# Patient Record
Sex: MALE | Race: WHITE | Employment: UNEMPLOYED | URBAN - METROPOLITAN AREA
[De-identification: names, ages, dates, MRNs, and addresses within clinical notes are randomized per-mention and may not be internally consistent; named-entity substitution may affect disease eponyms.]

---

## 2018-10-08 ENCOUNTER — APPOINTMENT (OUTPATIENT)
Dept: CT IMAGING | Age: 63
End: 2018-10-08
Attending: EMERGENCY MEDICINE
Payer: MEDICARE

## 2018-10-08 ENCOUNTER — APPOINTMENT (OUTPATIENT)
Dept: GENERAL RADIOLOGY | Age: 63
End: 2018-10-08
Attending: PHYSICIAN ASSISTANT
Payer: MEDICARE

## 2018-10-08 ENCOUNTER — APPOINTMENT (OUTPATIENT)
Dept: GENERAL RADIOLOGY | Age: 63
End: 2018-10-08
Attending: EMERGENCY MEDICINE
Payer: MEDICARE

## 2018-10-08 ENCOUNTER — HOSPITAL ENCOUNTER (EMERGENCY)
Age: 63
Discharge: HOME | End: 2018-10-08
Attending: EMERGENCY MEDICINE | Admitting: EMERGENCY MEDICINE
Payer: MEDICARE

## 2018-10-08 ENCOUNTER — HOSPITAL ENCOUNTER (EMERGENCY)
Age: 63
Discharge: HOME | End: 2018-10-09
Attending: EMERGENCY MEDICINE | Admitting: EMERGENCY MEDICINE
Payer: MEDICARE

## 2018-10-08 VITALS
WEIGHT: 120 LBS | HEIGHT: 68 IN | BODY MASS INDEX: 18.19 KG/M2 | OXYGEN SATURATION: 99 % | HEART RATE: 97 BPM | SYSTOLIC BLOOD PRESSURE: 117 MMHG | RESPIRATION RATE: 17 BRPM | TEMPERATURE: 98 F | DIASTOLIC BLOOD PRESSURE: 78 MMHG

## 2018-10-08 DIAGNOSIS — F10.920 ACUTE ALCOHOLIC INTOXICATION WITHOUT COMPLICATION (HCC): ICD-10-CM

## 2018-10-08 DIAGNOSIS — R06.02 SOB (SHORTNESS OF BREATH): ICD-10-CM

## 2018-10-08 DIAGNOSIS — S42.292D OTHER CLOSED DISPLACED FRACTURE OF PROXIMAL END OF LEFT HUMERUS WITH ROUTINE HEALING, SUBSEQUENT ENCOUNTER: Primary | ICD-10-CM

## 2018-10-08 DIAGNOSIS — S42.215A CLOSED NONDISPLACED FRACTURE OF SURGICAL NECK OF LEFT HUMERUS, UNSPECIFIED FRACTURE MORPHOLOGY, INITIAL ENCOUNTER: Primary | ICD-10-CM
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
## 2018-10-08 LAB
ALBUMIN SERPL-MCNC: 3.3 G/DL (ref 3.4–5)
ALBUMIN/GLOB SERPL: 0.9 {RATIO} (ref 0.8–1.7)
ALP SERPL-CCNC: 164 U/L (ref 45–117)
ALT SERPL-CCNC: 30 U/L (ref 16–61)
AMPHET UR QL SCN: NEGATIVE
ANION GAP SERPL CALC-SCNC: 14 MMOL/L (ref 3–18)
AST SERPL-CCNC: 69 U/L (ref 15–37)
BARBITURATES UR QL SCN: NEGATIVE
BASOPHILS # BLD: 0 K/UL (ref 0–0.1)
BASOPHILS NFR BLD: 0 % (ref 0–2)
BENZODIAZ UR QL: NEGATIVE
BILIRUB SERPL-MCNC: 0.7 MG/DL (ref 0.2–1)
BUN SERPL-MCNC: 3 MG/DL (ref 7–18)
BUN/CREAT SERPL: 6 (ref 12–20)
CALCIUM SERPL-MCNC: 8.1 MG/DL (ref 8.5–10.1)
CANNABINOIDS UR QL SCN: NEGATIVE
CHLORIDE SERPL-SCNC: 95 MMOL/L (ref 100–108)
CK MB CFR SERPL CALC: 4 % (ref 0–4)
CK MB SERPL-MCNC: 4.4 NG/ML (ref 5–25)
CK SERPL-CCNC: 109 U/L (ref 39–308)
CO2 SERPL-SCNC: 25 MMOL/L (ref 21–32)
COCAINE UR QL SCN: NEGATIVE
CREAT SERPL-MCNC: 0.53 MG/DL (ref 0.6–1.3)
DIFFERENTIAL METHOD BLD: ABNORMAL
EOSINOPHIL # BLD: 0 K/UL (ref 0–0.4)
EOSINOPHIL NFR BLD: 0 % (ref 0–5)
ERYTHROCYTE [DISTWIDTH] IN BLOOD BY AUTOMATED COUNT: 11.7 % (ref 11.6–14.5)
ETHANOL SERPL-MCNC: 253 MG/DL (ref 0–3)
GLOBULIN SER CALC-MCNC: 3.6 G/DL (ref 2–4)
GLUCOSE SERPL-MCNC: 96 MG/DL (ref 74–99)
HCT VFR BLD AUTO: 44.5 % (ref 36–48)
HDSCOM,HDSCOM: NORMAL
HGB BLD-MCNC: 16.1 G/DL (ref 13–16)
LYMPHOCYTES # BLD: 1 K/UL (ref 0.9–3.6)
LYMPHOCYTES NFR BLD: 15 % (ref 21–52)
MAGNESIUM SERPL-MCNC: 1.8 MG/DL (ref 1.6–2.6)
MCH RBC QN AUTO: 34 PG (ref 24–34)
MCHC RBC AUTO-ENTMCNC: 36.2 G/DL (ref 31–37)
MCV RBC AUTO: 94.1 FL (ref 74–97)
METHADONE UR QL: NEGATIVE
MONOCYTES # BLD: 0.3 K/UL (ref 0.05–1.2)
MONOCYTES NFR BLD: 5 % (ref 3–10)
NEUTS SEG # BLD: 5.1 K/UL (ref 1.8–8)
NEUTS SEG NFR BLD: 80 % (ref 40–73)
OPIATES UR QL: NEGATIVE
PCP UR QL: NEGATIVE
PLATELET # BLD AUTO: 180 K/UL (ref 135–420)
PMV BLD AUTO: 9.4 FL (ref 9.2–11.8)
POTASSIUM SERPL-SCNC: 3.6 MMOL/L (ref 3.5–5.5)
PROT SERPL-MCNC: 6.9 G/DL (ref 6.4–8.2)
RBC # BLD AUTO: 4.73 M/UL (ref 4.7–5.5)
SODIUM SERPL-SCNC: 134 MMOL/L (ref 136–145)
TROPONIN I SERPL-MCNC: 0.04 NG/ML (ref 0–0.04)
WBC # BLD AUTO: 6.4 K/UL (ref 4.6–13.2)

## 2018-10-08 PROCEDURE — 74150 CT ABDOMEN W/O CONTRAST: CPT

## 2018-10-08 PROCEDURE — 80307 DRUG TEST PRSMV CHEM ANLYZR: CPT | Performed by: EMERGENCY MEDICINE

## 2018-10-08 PROCEDURE — 94640 AIRWAY INHALATION TREATMENT: CPT

## 2018-10-08 PROCEDURE — 93005 ELECTROCARDIOGRAM TRACING: CPT

## 2018-10-08 PROCEDURE — 80053 COMPREHEN METABOLIC PANEL: CPT | Performed by: EMERGENCY MEDICINE

## 2018-10-08 PROCEDURE — 96374 THER/PROPH/DIAG INJ IV PUSH: CPT

## 2018-10-08 PROCEDURE — 99285 EMERGENCY DEPT VISIT HI MDM: CPT

## 2018-10-08 PROCEDURE — 77030018836 HC SOL IRR NACL ICUM -A

## 2018-10-08 PROCEDURE — 74011000250 HC RX REV CODE- 250: Performed by: EMERGENCY MEDICINE

## 2018-10-08 PROCEDURE — 85025 COMPLETE CBC W/AUTO DIFF WBC: CPT | Performed by: EMERGENCY MEDICINE

## 2018-10-08 PROCEDURE — 74011250636 HC RX REV CODE- 250/636: Performed by: EMERGENCY MEDICINE

## 2018-10-08 PROCEDURE — 77030029684 HC NEB SM VOL KT MONA -A

## 2018-10-08 PROCEDURE — 74011250637 HC RX REV CODE- 250/637: Performed by: EMERGENCY MEDICINE

## 2018-10-08 PROCEDURE — 82550 ASSAY OF CK (CPK): CPT | Performed by: EMERGENCY MEDICINE

## 2018-10-08 PROCEDURE — 73030 X-RAY EXAM OF SHOULDER: CPT

## 2018-10-08 PROCEDURE — 70450 CT HEAD/BRAIN W/O DYE: CPT

## 2018-10-08 PROCEDURE — 73020 X-RAY EXAM OF SHOULDER: CPT

## 2018-10-08 PROCEDURE — 83735 ASSAY OF MAGNESIUM: CPT | Performed by: EMERGENCY MEDICINE

## 2018-10-08 PROCEDURE — L3650 SO 8 ABD RESTRAINT PRE OTS: HCPCS

## 2018-10-08 RX ORDER — IPRATROPIUM BROMIDE AND ALBUTEROL SULFATE 2.5; .5 MG/3ML; MG/3ML
3 SOLUTION RESPIRATORY (INHALATION)
Status: COMPLETED | OUTPATIENT
Start: 2018-10-08 | End: 2018-10-08

## 2018-10-08 RX ORDER — MORPHINE SULFATE 4 MG/ML
4 INJECTION INTRAVENOUS
Status: COMPLETED | OUTPATIENT
Start: 2018-10-08 | End: 2018-10-08

## 2018-10-08 RX ORDER — MORPHINE SULFATE 4 MG/ML
4 INJECTION INTRAVENOUS
Status: DISCONTINUED | OUTPATIENT
Start: 2018-10-08 | End: 2018-10-08

## 2018-10-08 RX ORDER — CYCLOBENZAPRINE HCL 5 MG
10 TABLET ORAL 3 TIMES DAILY
Qty: 18 TAB | Refills: 0 | Status: SHIPPED | OUTPATIENT
Start: 2018-10-08 | End: 2018-10-08

## 2018-10-08 RX ORDER — IBUPROFEN 800 MG/1
800 TABLET ORAL EVERY 8 HOURS
Qty: 15 TAB | Refills: 0 | Status: SHIPPED | OUTPATIENT
Start: 2018-10-08 | End: 2018-10-13

## 2018-10-08 RX ORDER — HYDROCODONE BITARTRATE AND ACETAMINOPHEN 5; 325 MG/1; MG/1
1 TABLET ORAL
Status: COMPLETED | OUTPATIENT
Start: 2018-10-08 | End: 2018-10-08

## 2018-10-08 RX ORDER — MORPHINE SULFATE 10 MG/ML
4 INJECTION, SOLUTION INTRAMUSCULAR; INTRAVENOUS
Status: DISCONTINUED | OUTPATIENT
Start: 2018-10-08 | End: 2018-10-08

## 2018-10-08 RX ORDER — BACITRACIN 500 UNIT/G
5 PACKET (EA) TOPICAL
Status: COMPLETED | OUTPATIENT
Start: 2018-10-08 | End: 2018-10-08

## 2018-10-08 RX ORDER — IBUPROFEN 800 MG/1
800 TABLET ORAL EVERY 8 HOURS
Qty: 15 TAB | Refills: 0 | Status: SHIPPED | OUTPATIENT
Start: 2018-10-08 | End: 2018-10-08

## 2018-10-08 RX ORDER — HYDROCODONE BITARTRATE AND ACETAMINOPHEN 5; 325 MG/1; MG/1
TABLET ORAL
Qty: 6 TAB | Refills: 0 | Status: SHIPPED | OUTPATIENT
Start: 2018-10-08

## 2018-10-08 RX ORDER — ONDANSETRON 4 MG/1
4 TABLET, ORALLY DISINTEGRATING ORAL
Status: COMPLETED | OUTPATIENT
Start: 2018-10-08 | End: 2018-10-08

## 2018-10-08 RX ADMIN — IPRATROPIUM BROMIDE AND ALBUTEROL SULFATE 3 ML: .5; 3 SOLUTION RESPIRATORY (INHALATION) at 20:15

## 2018-10-08 RX ADMIN — MORPHINE SULFATE 4 MG: 4 INJECTION INTRAVENOUS at 13:58

## 2018-10-08 RX ADMIN — HYDROCODONE BITARTRATE AND ACETAMINOPHEN 1 TABLET: 5; 325 TABLET ORAL at 20:15

## 2018-10-08 RX ADMIN — ONDANSETRON 4 MG: 4 TABLET, ORALLY DISINTEGRATING ORAL at 20:28

## 2018-10-08 RX ADMIN — BACITRACIN ZINC 5 PACKET: 500 OINTMENT TOPICAL at 14:25

## 2018-10-08 RX ADMIN — HYDROCODONE BITARTRATE AND ACETAMINOPHEN 1 TABLET: 5; 325 TABLET ORAL at 22:50

## 2018-10-08 NOTE — DISCHARGE INSTRUCTIONS
SPECIFIC PATIENT INSTRUCTIONS FROM THE PHYSICIAN WHO TREATED YOU IN THE ER TODAY:  1. Ibuprofen as prescribed until finished. 2. Norco for pain not controlled with the ibuprofen. 3. Return if any concerns or worsening condition(s). 4. FOLLOW UP APPOINTMENT:  Your primary doctor in 1-2 days. Broken Arm: Care Instructions  Your Care Instructions  Fractures can range from a small, hairline crack, to a bone or bones broken into two or more pieces. Your treatment depends on how bad the break is. Your doctor may have put your arm in a splint or cast to allow it to heal or to keep it stable until you see another doctor. It may take weeks or months for your arm to heal. You can help your arm heal with some care at home. You heal best when you take good care of yourself. Eat a variety of healthy foods, and don't smoke. You may have had a sedative to help you relax. You may be unsteady after having sedation. It can take a few hours for the medicine's effects to wear off. Common side effects of sedation include nausea, vomiting, and feeling sleepy or tired. The doctor has checked you carefully, but problems can develop later. If you notice any problems or new symptoms, get medical treatment right away. Follow-up care is a key part of your treatment and safety. Be sure to make and go to all appointments, and call your doctor if you are having problems. It's also a good idea to know your test results and keep a list of the medicines you take. How can you care for yourself at home? · If the doctor gave you a sedative:  ¨ For 24 hours, don't do anything that requires attention to detail. It takes time for the medicine's effects to completely wear off. ¨ For your safety, do not drive or operate any machinery that could be dangerous. Wait until the medicine wears off and you can think clearly and react easily. · Put ice or a cold pack on your arm for 10 to 20 minutes at a time.  Try to do this every 1 to 2 hours for the next 3 days (when you are awake). Put a thin cloth between the ice and your cast or splint. Keep the cast or splint dry. · Follow the cast care instructions your doctor gives you. If you have a splint, do not take it off unless your doctor tells you to. · Be safe with medicines. Take pain medicines exactly as directed. ¨ If the doctor gave you a prescription medicine for pain, take it as prescribed. ¨ If you are not taking a prescription pain medicine, ask your doctor if you can take an over-the-counter medicine. · Prop up your arm on pillows when you sit or lie down in the first few days after the injury. Keep the arm higher than the level of your heart. This will help reduce swelling. · Follow instructions for exercises to keep your arm strong. · Wiggle your fingers and wrist often to reduce swelling and stiffness. When should you call for help? Call 911 anytime you think you may need emergency care. For example, call if:    · You are very sleepy and you have trouble waking up.    Call your doctor now or seek immediate medical care if:    · You have new or worse nausea or vomiting.     · You have new or worse pain.     · Your hand or fingers are cool or pale or change color.     · Your cast or splint feels too tight.     · You have tingling, weakness, or numbness in your hand or fingers.    Watch closely for changes in your health, and be sure to contact your doctor if:    · You do not get better as expected.     · You have problems with your cast or splint. Where can you learn more? Go to http://john-antoine.info/. Enter F450 in the search box to learn more about \"Broken Arm: Care Instructions. \"  Current as of: November 29, 2017  Content Version: 11.8  © 3337-6502 [employer]. Care instructions adapted under license by [employer] (which disclaims liability or warranty for this information).  If you have questions about a medical condition or this instruction, always ask your healthcare professional. [place] [address] any warranty or liability for your use of this information. Bones of the Arm: Anatomy Sketch    Current as of: November 29, 2017  Content Version: 11.8  © 9195-9824 [employer]. Care instructions adapted under license by [employer]r (which disclaims liability or warranty for this information). If you have questions about a medical condition or this instruction, always ask your healthcare professional. [facility] any warranty or liability for your use of this information. Preventing Falls: Care Instructions  Your Care Instructions    Getting around your home safely can be a challenge if you have injuries or health problems that make it easy for you to fall. Loose rugs and furniture in walkways are among the dangers for many older people who have problems walking or who have poor eyesight. People who have conditions such as arthritis, osteoporosis, or dementia also have to be careful not to fall. You can make your home safer with a few simple measures. Follow-up care is a key part of your treatment and safety. Be sure to make and go to all appointments, and call your doctor if you are having problems. It's also a good idea to know your test results and keep a list of the medicines you take. How can you care for yourself at home? Taking care of yourself  · You may get dizzy if you do not drink enough water. To prevent dehydration, drink plenty of fluids, enough so that your urine is light yellow or clear like water. Choose water and other caffeine-free clear liquids. If you have kidney, heart, or liver disease and have to limit fluids, talk with your doctor before you increase the amount of fluids you drink. · Exercise regularly to improve your strength, muscle tone, and balance. Walk if you can. Swimming may be a good choice if you cannot walk easily.   · Have your vision and hearing checked each year or any time you notice a change. If you have trouble seeing and hearing, you might not be able to avoid objects and could lose your balance. · Know the side effects of the medicines you take. Ask your doctor or pharmacist whether the medicines you take can affect your balance. Sleeping pills or sedatives can affect your balance. · Limit the amount of alcohol you drink. Alcohol can impair your balance and other senses. · Ask your doctor whether calluses or corns on your feet need to be removed. If you wear loose-fitting shoes because of calluses or corns, you can lose your balance and fall. · Talk to your doctor if you have numbness in your feet. Preventing falls at home  · Remove raised doorway thresholds, throw rugs, and clutter. Repair loose carpet or raised areas in the floor. · Move furniture and electrical cords to keep them out of walking paths. · Use nonskid floor wax, and wipe up spills right away, especially on ceramic tile floors. · If you use a walker or cane, put rubber tips on it. If you use crutches, clean the bottoms of them regularly with an abrasive pad, such as steel wool. · Keep your house well lit, especially [place], and outside walkways. Use night-lights in areas such as hallways and bathrooms. Add extra light switches or use remote switches (such as switches that go on or off when you clap your hands) to make it easier to turn lights on if you have to get up during the night. · Install sturdy handrails on stairways. · Move items in your cabinets so that the things you use a lot are on the lower shelves (about waist level). · Keep a cordless phone and a flashlight with new batteries by your bed. If possible, put a phone in each of the main rooms of your house, or carry a cell phone in case you fall and cannot reach a phone. Or, you can wear a device around your neck or wrist. You push a button that sends a signal for help.   · Wear low-heeled shoes that fit well and give your feet good support. Use footwear with nonskid soles. Check the heels and soles of your shoes for wear. Repair or replace worn heels or soles. · Do not wear socks without shoes on wood floors. · Walk on the grass when the sidewalks are slippery. If you live in an area that gets snow and ice in the winter, sprinkle salt on slippery steps and sidewalks. Preventing falls in the bath  · Install grab bars and nonskid mats inside and outside your shower or tub and near the toilet and sinks. · Use shower chairs and bath benches. · Use a hand-held shower head that will allow you to sit while showering. · Get into a tub or shower by putting the weaker leg in first. Get out of a tub or shower with your strong side first.  · Repair loose toilet seats and consider installing a raised toilet seat to make getting on and off the toilet easier. · Keep your bathroom door unlocked while you are in the shower. Where can you learn more? Go to http://johnElectrikusantoine.info/. Enter 0476 79 [id number] in the search box to learn more about \"Preventing Falls: Care Instructions. \"  Current as of: March 16, 2018  Content Version: 11.8  © 1971-6848 [employer]. Care instructions adapted under license by [employer] (which disclaims liability or warranty for this information). If you have questions about a medical condition or this instruction, always ask your healthcare professional. [place] [address] any warranty or liability for your use of this information. How to Get Up Safely After a Fall: Care Instructions  Your Care Instructions    If you have injuries, health problems, or other reasons that may make it easy for you to fall at home, it is a good idea to learn how to get up safely after a fall. Learning how to get up correctly can help you avoid making an injury worse.   Also, knowing what to do if you cannot get up can help you stay safe until help arrives. Follow-up care is a key part of your treatment and safety. Be sure to make and go to all appointments, and call your doctor if you are having problems. It's also a good idea to know your test results and keep a list of the medicines you take. How can you care for yourself after a fall? If you think you can get up  First lie still for a few minutes and think about how you feel. If your body feels okay and you think you can get up safely, follow the rest of the steps below:  1. Look for a chair or other piece of furniture that is close to you. 2. Roll onto your side and rest. Roll by turning your head in the direction you want to roll, move your shoulder and arm, then hip and leg in the same direction. 3. Lie still for a moment to let your blood pressure adjust.  4. Slowly push your upper body up, lift your head, and take a moment to rest.  5. Slowly get up on your hands and knees, and crawl to the chair or other stable piece of furniture. 6. Put your hands on the chair. 7. Move one foot forward, and place it flat on the floor. Your other leg should be bent with the knee on the floor. 8. Rise slowly, turn your body, and sit in the chair. Stay seated for a bit and think about how you feel. Call for help. Even if you feel okay, let someone know what happened to you. You might not know that you have a serious injury. If you cannot get up  1. If you think you are injured after a fall or you cannot get up, try not to panic. 2. Call out for help. 3. If you have a phone within reach or you have an emergency call device, use it to call for help. 4. If you do not have a phone within reach, try to slide yourself toward it. If you cannot get to the phone, try to slide toward a door or window or a place where you think you can be heard. 5. [place] or use an object to make noise so someone might hear you. 6. If you can reach something that you can use for a pillow, place it under your head.  Try to stay warm by covering yourself with a blanket or clothing while you wait for help. When should you call for help? Call 911 anytime you think you may need emergency care. For example, call if:    · You passed out (lost consciousness).     · You cannot get up after a fall.     · You have severe pain.    Call your doctor now or seek immediate medical care if:    · You have new or worse pain.     · You are dizzy or lightheaded.     · You hit your head.    Watch closely for changes in your health, and be sure to contact your doctor if:    · You do not get better as expected. Where can you learn more? Go to http://johnCuPcAkE & other things you bakeantoine.info/. Enter K426 in the search box to learn more about \"How to Get Up Safely After a Fall: Care Instructions. \"  Current as of: March 16, 2018  Content Version: 11.8  © 0729-3525 [employer]. Care instructions adapted under license by [employer] (which disclaims liability or warranty for this information). If you have questions about a medical condition or this instruction, always ask your healthcare professional. [place] [address] any warranty or liability for your use of this information. 13 Ways to Prevent Falls in the Hospital  When you're in the hospital, your risk of falling may be higher than normal. Medicines can make you dizzy. Or illness and treatments may cause you to feel weak and confused, making it harder to move around. But there are ways you can prevent falls during your stay. Things you can do to prevent a fall at the hospital   Bring nonskid socks, slippers, or shoes that stay on your feet. If you do not have these, ask the nurse for a pair of nonskid socks. Bring your walker or cane, if you use one at home. Or ask the hospital to provide one during your stay. Ask your doctor or nurse if your treatments or medicines will increase your risk of a fall.  Some hospitals will check your risk when you are admitted to the hospital.    Tell your doctor or nurse if medicines make you dizzy, weak, or lightheaded. If you feel this way, do not try to get up on your own. Call the nurse for help. Call the nurse for help getting out of bed if you are on crutches or have trouble walking. Do not try to do it on your own until the nurse says it's ok and you feel sure you are able. When your nurse says it's ok, get up slowly. Sit up first and count to 10 before you get out of bed. Put on your eyeglasses before you get out of bed, if you wear them. If you need help getting to the bathroom, call the nurse before you have an urgent need to go. If you get fluids through a vein (IV), you may need to go to the bathroom more often. Things the hospital staff can do to prevent a fall   Keep needed items close by. Your phone, the nurse call light or button, and anything you need to help you walk should be close to you. Keep your bed low and locked. Your bed should be low enough so that you don't have problems getting out of bed. The wheels on your bed should be locked so the bed can't move. Explain what is safe. Your nurse or doctor will tell you what you can safely do and how often you need to get up and move around. Keep your room neat. Your room should not have any wet or slippery areas. There should be nothing in the way of going to the bathroom or hallway. Light up the room. Your room should have good lighting. Make sure there is a night light in your bathroom. Current as of: Anuradha 3, 2018  Content Version: 11.8  © 3001-2268 Healthwise, Incorporated. Care instructions adapted under license by [employer] (which disclaims liability or warranty for this information). If you have questions about a medical condition or this instruction, always ask your healthcare professional. [address] any warranty or liability for your use of this information.          Preventing Outdoor Falls: Care Instructions  Your Care Instructions    Worries about falls don't need to keep you indoors. Outdoor activities like walking have big benefits for your health. You will need to watch your step and learn a few safety measures. If you are worried about having a fall outdoors, ask your doctor about exercises, classes, or physical therapy that may help. You can learn ways to gain strength, flexibility, and balance. Ask if it might help to use a cane or walker. You can make your time outdoors safer with a few simple measures. Follow-up care is a key part of your treatment and safety. Be sure to make and go to all appointments, and call your doctor if you are having problems. It's also a good idea to know your test results and keep a list of the medicines you take. How can you prevent falls outdoors? · Wear shoes with firm soles and low heels. If you have to walk on an icy surface, use grippers that can be worn over your shoes in bad weather. · Be extra careful if weather is bad. Walk on the grass when the sidewalks are slick. If you live in a place that gets snow and ice in the winter, sprinkle salt on slippery stairs and sidewalks. · Be careful getting on or off buses and trains or getting in and out of cars. If handrails are available, use them. · Be careful when you cross the street. Look for crosswalks or places where curb cuts or ramps are present. · Try not to hurry, especially if you are carrying something. · Be cautious in parking lots or garages. There may be curbs or changes in pavement, or the height of the pavement may vary. · Make sure to wear the correct eyeglasses, if you need them. Reading glasses or bifocals can make it harder to see hazards that might be in your way. · If you are walking outdoors for exercise, try to:  ¨ Walk in well-lighted, well-maintained areas. These include high school or college tracks, shopping malls, and public spaces. ¨ Walk with a partner.   ¨ Watch out for cracked sidewalks, curbs, changes in the height of the pavement, exposed tree roots, and debris such as fallen leaves or branches. Where can you learn more? Go to http://john-antoine.info/. Enter Z064 in the search box to learn more about \"Preventing Outdoor Falls: Care Instructions. \"  Current as of: 2018  Content Version: 11.8  © 3427-0298 [employer]. Care instructions adapted under license by [employer] (which disclaims liability or warranty for this information). If you have questions about a medical condition or this instruction, always ask your healthcare professional. [address] any warranty or liability for your use of this information. Hiptype Activation    Thank you for requesting access to [facility]. Please follow the instructions below to securely access and download your online medical record. Hiptype allows you to send messages to your doctor, view your test results, renew your prescriptions, schedule appointments, and more. How Do I Sign Up? 1. In your internet browser, go to [email or web address]. 2. Click on the First Time User? Click Here link in the Sign In box. You will see the New Member Sign Up page. 3. Enter your Hiptype Access Code exactly as it appears below. You will not need to use this code after youve completed the sign-up process. If you do not sign up before the expiration date, you must request a new code. Hiptype Access Code: [email or web address]  Expires: 2019  1:47 PM (This is the date your Hiptype access code will )    4. Enter the last four digits of your Social Security Number (xxxx) and Date of Birth (mm/dd/yyyy) as indicated and click Submit. You will be taken to the next sign-up page. 5. Create a Hiptype ID. This will be your Hiptype login ID and cannot be changed, so think of one that is secure and easy to remember. 6. Create a Hiptype password.  You can change your password at any time. 7. Enter your Password Reset Question and Answer. This can be used at a later time if you forget your password. 8. Enter your e-mail address. You will receive e-mail notification when new information is available in [address] 9. Click Sign Up. You can now view and download portions of your medical record. 10. Click the Download Summary menu link to download a portable copy of your medical information. Additional Information    If you have questions, please visit the Frequently Asked Questions section of the [facility] website at [email or web address] Remember, [facility] is NOT to be used for urgent needs. For medical emergencies, dial 911. I have reviewed discharge instructions with the patient. The patient verbalized understanding.

## 2018-10-08 NOTE — ED NOTES
Ambulatory to wheelchair. Gait is steady. Denies dizziness. Patient transported to waiting room for transportation to home.

## 2018-10-08 NOTE — ED NOTES
Wounds to bilateral arms cleansed with soap and water. 3ATB oinment applied to open areas and dressed with telfa, secured with kerlix. Shoulder immobilizer applied to left upper extremity. Neurovascular intact post procedure. Patient tolerates poorly. Becomes pale and dizzy in sitting position. Near syncopal.  Dr. Annabella Cespedes aware and orders received for CT. Patient returned to position of comfort.

## 2018-10-08 NOTE — ED PROVIDER NOTES
[facility] EMERGENCY DEPT 
 
 
10:59 AM 
 
Date: 10/8/2018 Patient Name: Rafiq Leach History of Presenting Illness Chief Complaint Patient presents with  Shoulder Injury  Syncope  Respiratory Distress History Provided By: Patient and EMS Chief Complaint: Shoulder pain Duration:  Hours Timing:  Acute Location: L shoulder Quality: Stabbing and Wanda Rend Severity: 8 out of 10 Modifying Factors: worsened by movement Associated Symptoms: dizziness, fall, R rib pain, RUE abrasions 60 y.o. male with noted past medical history who presents to the emergency department per EMS with acute, severe and stabbing L shoulder pain s/p fall occurring 7 hrs PTA. Per EMS pt stood up from couch, felt dizzy and fell. Associated sx include R rib pain, RUE abrasions as pt fell on his R side. Pt also notes diarrhea for 2wks. Denies excessive etOH use prior to fall. Pt uses tobacco. 
 
No other complaints. Nursing notes regarding the HPI and triage nursing notes were reviewed. Prior medical records were reviewed. Current Outpatient Prescriptions Medication Sig Dispense Refill  ibuprofen (MOTRIN) 800 mg tablet Take 1 Tab by mouth every eight (8) hours for 5 days. 15 Tab 0  
 HYDROcodone-acetaminophen (NORCO) 5-325 mg per tablet Take 1-2 tablets PO every 4-6 hours as needed for pain control. If over the counter ibuprofen or acetaminophen was suggested, then only take the vicodin for pain not well controlled with the over the counter medication. 6 Tab 0  
 CIPROFLOXACIN HCL (CIPRO PO) Take  by mouth.  oxyCODONE-acetaminophen (PERCOCET) 5-325 mg per tablet Take 1 Tab by mouth every four (4) hours as needed. Past History Past Medical History: 
Past Medical History:  
Diagnosis Date  Hematuria Past Surgical History: 
Past Surgical History:  
Procedure Laterality Date  HX WISDOM TEETH EXTRACTION Family History: 
Family History Problem Relation Age of Onset  Cancer Father Stomach  Stroke Mother Social History: 
Social History Substance Use Topics  Smoking status: Current Every Day Smoker Packs/day: 1.00 Years: 40.00 Types: Cigarettes  Smokeless tobacco: Never Used  Alcohol use Yes Comment: 1-2 beers at [facility] Allergies: Allergies Allergen Reactions  Sulfa (Sulfonamide Antibiotics) Unknown (comments) Patient's primary care provider (as noted in EPIC):  None Review of Systems Constitutional: Negative for diaphoresis. HENT: Negative for congestion. Eyes: Negative for discharge. Respiratory: Negative for stridor. Cardiovascular: Negative for chest pain. Gastrointestinal: Positive for diarrhea. Genitourinary: Negative for flank pain. Musculoskeletal: Positive for arthralgias (L shoulder) and myalgias. Negative for back pain. Positive for R rib pain Skin: Positive for wound (RUE). Neurological: Positive for dizziness and light-headedness. Weakness: generalized. Psychiatric/Behavioral: Negative for hallucinations. All other systems reviewed and are negative. Visit Vitals  /71 (BP 1 Location: Right arm, BP Patient Position: At rest;Supine)  Pulse (!) 53  Temp 98.3 °F (36.8 °C)  Resp 22  
 Ht 5' 8\" (1.727 m)  Wt 54.4 kg (120 lb)  SpO2 99%  BMI 18.25 kg/m2 PHYSICAL EXAM: 
 
CONSTITUTIONAL: Alert, in no apparent distress; well-developed; well-nourished. HEAD:  Normocephalic, atraumatic. No Battles sign. No Raccoons eyes. EYES:  EOM's intact. Normal conjunctiva. Anicteric sclera. ENTM: Nose: no rhinorrhea; Oropharynx:  mucous membranes moist 
 
Neck:  No JVD. No cervical vertebral bony point tenderness or step-off. Chest:  Right mid chest, anterolateral aspect, has focal reproducible TTP. No crepitus. No bruising. RESP: Chest clear, equal breath sounds. CARDIOVASCULAR:  Regular rate and rhythm. No murmurs, rubs, or gallops. GI: Normal bowel sounds, abdomen soft and non-tender. No masses or organomegaly. : No costo-vertebral angle tenderness. BACK:  No TLS vertebral bony point tenderness or step-off. UPPER EXT:  Normal inspection. Left shoulder has diffuse moderate reproducible tenderness to palpation. Anterior shoulder swelling (? Possible dislocation). No rash or lesions. LOWER EXT: No edema, no calf tenderness. Distal pulses intact. NEURO: Grossly normal motor and sensation. SKIN: No rashes; Normal for age and stage. PSYCH:  Alert and oriented, normal affect. DIFFERENTIAL DIAGNOSES/ MEDICAL DECISION MAKING: 
Contusion, sprain, dislocation, fracture, ligamentous tear/ disruption or a combination of the above. Diagnostic Study Results Abnormal lab results from this emergency department encounter: 
Labs Reviewed CBC WITH AUTOMATED DIFF - Abnormal; Notable for the following:   
    Result Value HGB 16.1 (*) NEUTROPHILS 80 (*) LYMPHOCYTES 15 (*) All other components within normal limits METABOLIC PANEL, COMPREHENSIVE - Abnormal; Notable for the following:   
 Sodium 134 (*) Chloride 95 (*) BUN 3 (*) Creatinine 0.53 (*)   
 BUN/Creatinine ratio 6 (*) Calcium 8.1 (*) AST (SGOT) 69 (*) Alk. phosphatase 164 (*) Albumin 3.3 (*) All other components within normal limits CARDIAC PANEL,(CK, CKMB & TROPONIN) - Abnormal; Notable for the following:   
 CK - MB 4.4 (*) All other components within normal limits ETHYL ALCOHOL - Abnormal; Notable for the following:   
 ALCOHOL(ETHYL),SERUM 253 (*) All other components within normal limits MAGNESIUM  
DRUG SCREEN, URINE Lab values for this patient within approximately the last 12 hours: 
Recent Results (from the past 12 hour(s)) EKG, 12 LEAD, INITIAL Collection Time: 10/08/18 11:01 AM  
Result Value Ref Range Ventricular Rate 96 BPM  
 Atrial Rate 96 BPM  
 P-R Interval 128 ms QRS Duration 96 ms  
 Q-T Interval 438 ms QTC Calculation (Bezet) 553 ms Calculated P Axis 83 degrees Calculated R Axis -45 degrees Calculated T Axis -102 degrees Diagnosis Sinus rhythm with frequent premature ventricular complexes Possible Left atrial enlargement Left axis deviation Incomplete right bundle branch block Marked T wave abnormality, consider anterolateral ischemia Prolonged QT Abnormal ECG No previous ECGs available CBC WITH AUTOMATED DIFF Collection Time: 10/08/18 12:05 PM  
Result Value Ref Range WBC 6.4 4.6 - 13.2 K/uL  
 RBC 4.73 4.70 - 5.50 M/uL  
 HGB 16.1 (H) 13.0 - 16.0 g/dL HCT 44.5 36.0 - 48.0 % MCV 94.1 74.0 - 97.0 FL  
 MCH 34.0 24.0 - 34.0 PG  
 MCHC 36.2 31.0 - 37.0 g/dL  
 RDW 11.7 11.6 - 14.5 % PLATELET 638 462 - 475 K/uL MPV 9.4 9.2 - 11.8 FL  
 NEUTROPHILS 80 (H) 40 - 73 % LYMPHOCYTES 15 (L) 21 - 52 % MONOCYTES 5 3 - 10 % EOSINOPHILS 0 0 - 5 % BASOPHILS 0 0 - 2 %  
 ABS. NEUTROPHILS 5.1 1.8 - 8.0 K/UL  
 ABS. LYMPHOCYTES 1.0 0.9 - 3.6 K/UL  
 ABS. MONOCYTES 0.3 0.05 - 1.2 K/UL  
 ABS. EOSINOPHILS 0.0 0.0 - 0.4 K/UL  
 ABS. BASOPHILS 0.0 0.0 - 0.1 K/UL  
 DF AUTOMATED METABOLIC PANEL, COMPREHENSIVE Collection Time: 10/08/18 12:05 PM  
Result Value Ref Range Sodium 134 (L) 136 - 145 mmol/L Potassium 3.6 3.5 - 5.5 mmol/L Chloride 95 (L) 100 - 108 mmol/L  
 CO2 25 21 - 32 mmol/L Anion gap 14 3.0 - 18 mmol/L Glucose 96 74 - 99 mg/dL BUN 3 (L) 7.0 - 18 MG/DL Creatinine 0.53 (L) 0.6 - 1.3 MG/DL  
 BUN/Creatinine ratio 6 (L) 12 - 20 GFR est AA >60 >60 ml/min/1.73m2 GFR est non-AA >60 >60 ml/min/1.73m2 Calcium 8.1 (L) 8.5 - 10.1 MG/DL Bilirubin, total 0.7 0.2 - 1.0 MG/DL  
 ALT (SGPT) 30 16 - 61 U/L  
 AST (SGOT) 69 (H) 15 - 37 U/L Alk. phosphatase 164 (H) 45 - 117 U/L Protein, total 6.9 6.4 - 8.2 g/dL Albumin 3.3 (L) 3.4 - 5.0 g/dL Globulin 3.6 2.0 - 4.0 g/dL A-G Ratio 0.9 0.8 - 1.7 MAGNESIUM Collection Time: 10/08/18 12:05 PM  
Result Value Ref Range Magnesium 1.8 1.6 - 2.6 mg/dL CARDIAC PANEL,(CK, CKMB & TROPONIN) Collection Time: 10/08/18 12:05 PM  
Result Value Ref Range  39 - 308 U/L  
 CK - MB 4.4 (H) <3.6 ng/ml CK-MB Index 4.0 0.0 - 4.0 % Troponin-I, Qt. 0.04 0.0 - 0.045 NG/ML  
ETHYL ALCOHOL Collection Time: 10/08/18 12:05 PM  
Result Value Ref Range ALCOHOL(ETHYL),SERUM 253 (H) 0 - 3 MG/DL  
DRUG SCREEN, URINE Collection Time: 10/08/18 12:33 PM  
Result Value Ref Range BENZODIAZEPINES NEGATIVE  NEG    
 BARBITURATES NEGATIVE  NEG    
 THC (TH-CANNABINOL) NEGATIVE  NEG    
 OPIATES NEGATIVE  NEG    
 PCP(PHENCYCLIDINE) NEGATIVE  NEG    
 COCAINE NEGATIVE  NEG    
 AMPHETAMINES NEGATIVE  NEG METHADONE NEGATIVE  NEG HDSCOM (NOTE) Radiologist and cardiologist interpretations if available at time of this note: Xr Shoulder Lt Ap/lat Min 2 V Result Date: 10/8/2018 Examination: Left shoulder 2 views. HISTORY: Fall with proximal left humerus fracture. FINDINGS: Internal and external rotation views of the left humerus as well as a transscapular projection obtained and interpreted without prior comparison. There is a comminuted, impacted, and mildly angulated fracture of the proximal left humerus with involvement of the surgical neck and greater tuberosity. [place] anterior angulation is demonstrated. Glenohumeral alignment is within normal limits. There are mild degenerative changes of the leona clavicular joint present. Included portions of the left lung and left chest wall demonstrate no acute abnormality. IMPRESSION: 1. Mildly comminuted, impacted, and angulated fracture of the proximal left humerus with involvement of the greater tuberosity and surgical neck. Ct Head Wo Cont Result Date: 10/8/2018 EXAM: CT of the brain without intravenous contrast. INDICATION:  \"fall. \" ADDITIONAL HISTORY:  Headache. COMPARISON: DOSE REDUCTION: One or more dose reduction techniques were used on this CT: automated exposure control, adjustment of the mAs and/or kVp according to patient size, and iterative reconstruction techniques. The specific techniques used on this CT exam have been documented in the patient's electronic medical record. _______________ FINDINGS:      IMAGE QUALITY:  Diagnostic. BRAIN AND EXTRA-AXIAL SPACE:            SUBACUTE TERRITORIAL TRANSCORTICAL INFARCTION:  None detected. MASS:  None detected. HEMORRHAGE:  None. SUBDURAL FLUID COLLECTION:  None detected. HYDROCEPHALUS:  None. REMOTE  TERRITORIAL CEREBRAL INFARCTION:  None detected. REMOTE CEREBELLAR INFARCTION:  None detected. STRIVE (STandards for Reporting Vascular changes on nEuroimaging):                            --[place] or perivascular spaces of presumed vascular origin:  None definite. --[place] of white matter hypodensity of presumed vascular origin:  None significant. --Degree of brain atrophy:  Low-grade. BURDEN OF CALCIFIC INTRACRANIAL ATHEROSCLEROSIS:   Marked. HEENT:            INCLUDED UPPER ORBITS:  Unremarkable. INCLUDED UPPER PARANASAL SINUSES:  Chronic right maxillary sinusitis with moderate mucosal thickening. MASTOID AIR CELLS:  Predominantly clear. BONES:  Markedly poor dentition. SCALP:  Unremarkable. _______________ IMPRESSION: 1. Generalized chronic changes, however, no mass or acute findings. 2.  Chronic right maxillary sinusitis. _______________ Ct Chest Abd Wo Cont Result Date: 10/8/2018 EXAM: CT of the chest and abdomen INDICATION: Status post fall with left  humerus fracture.  COMPARISON: Left shoulder x-ray 10/8/2018 TECHNIQUE: Axial CT imaging of the chest and abdomen was performed without intravenous contrast. Multiplanar reformats were generated. One or more dose reduction techniques were used on this CT: automated exposure control, adjustment of the mAs and/or kVp according to patient size, and iterative reconstruction techniques. The specific techniques used on this CT exam have been documented in the patient's electronic medical record. _______________ FINDINGS: CHEST: BASE OF THE NECK: Normal. LUNGS: A 0.4 cm nodule is present in the posterior aspect of the right lung apex. Mild emphysematous changes are present within both lungs. No suspicious pulmonary nodules. AIRWAY: Few scattered foci of mucous plugging are present within both lungs. A small amount of layering secretions is present within the PLEURA: Normal, with no effusion or pneumothorax. MEDIASTINUM: Normal heart size. Coronary artery calcifications are present. No pericardial effusion. Vasculature is unremarkable. LYMPH NODES: No enlarged lymph nodes. OTHER: There is diffuse stranding present within the region of the proximal skull fracture. =============== ABDOMEN/PELVIS: LIVER, BILIARY: The unenhanced liver is unremarkable. No biliary dilation. Gallbladder is unremarkable. PANCREAS: The unenhanced pancreas is unremarkable. SPLEEN: Normal. ADRENALS: Normal. KIDNEYS, URETERS, BLADDER: No hydronephrosis. LYMPH NODES: No enlarged lymph nodes. GASTROINTESTINAL TRACT: The visualized portions of small bowel and colon are normal in caliber and wall thickness. The appendix is normal. VASCULATURE: There is atherosclerotic calcification of the abdominal small dilation. BONES: Mildly displaced left humeral head/neck fracture is present. OTHER: None. _______________ IMPRESSION: 1. Mildly displaced left humeral head/neck fracture is present. Diffuse surrounding swelling and mild prominence of the muscles is present. No additional fractures.  2.  A 0.4 cm nodule is present in the right lung apex. ======== Fleischner Society Pulmonary Nodule Guidelines (revised 2017): Solid nodule <6 mm: -Low risk for lung cancer: No routine followup. -High risk for lung cancer: Optional CT in 12 months (suspicious morphology, upper lobe location, or both may warrant 12 month followup depending on comorbidities and patient preference). Xr Shoulder Lt 1 V Result Date: 10/8/2018 Examination: Left shoulder one view. HISTORY: Proximal left humerus fracture. FINDINGS: Single axillary projection of the left shoulder obtained and interpreted comparison to same day left shoulder radiographs. Glenohumeral alignment is normal. Comminuted and apex anterior angulation fracture of the proximal left humerus noted. IMPRESSION: 1. Normal glenohumeral ligament. 2. Comminuted and angulated proximal left humerus fracture. EKG reading by Hollie Campos MD: 
NSR about 95 bpm with normal width QRS and occasional PVC's. No STEMI. No ST depression. Anterolateral T wave inversions. Left shoulder xray: Humeral neck fracture and dislocation. Medication(s) ordered for patient during this emergency visit encounter: 
Medications  
morphine injection 4 mg (4 mg IntraVENous Given 10/8/18 9603) bacitracin 500 unit/gram packet 5 Packet (5 Packets Topical Given 10/8/18 0649) Medical Decision Making I am the first provider for this patient. I reviewed the vital signs, available nursing notes, past medical history, past surgical history, family history and social history. Vital Signs:  Reviewed the patient's vital signs. ED COURSE:   
 
IMPRESSION AND MEDICAL DECISION MAKING: 
Based upon the patients presentation with noted HPI and PE, along with the work up done in the emergency department, I believe that the patient is having noted contusion(s) from noted fall.   
 
 
SPECIFIC PATIENT INSTRUCTIONS FROM THE PHYSICIAN WHO TREATED YOU IN THE ER TODAY: 
 1. Ibuprofen as prescribed until finished. 2. Norco for pain not controlled with the ibuprofen. 3. Return if any concerns or worsening condition(s). 4. FOLLOW UP APPOINTMENT:  Your primary doctor in 1-2 days. Patient is improved, resting quietly and comfortably. The patient will be discharged home. The patient was reassured that these symptoms do not appear to represent a serious or life threatening condition at this time. Warning signs of worsening condition were discussed and understood by the patient. Based on patient's age, coexisting illness, exam, and the results of this ED evaluation, the decision to treat as an outpatient was made. Based on the information available at time of discharge, acute pathology requiring immediate intervention was deemed relative unlikely. While it is impossible to completely exclude the possibility of underlying serious disease or worsening of condition, I feel the relative likelihood is extremely low. I discussed this uncertainty with the patient, who understood ED evaluation and treatment and felt comfortable with the outpatient treatment plan. All questions regarding care, test results, and follow up were answered. The patient is stable and appropriate to discharge. They understand that they should return to the emergency department for any new or worsening symptoms. I stressed the importance of follow up for repeat assessment and possibly further evaluation/treatment. Coding Diagnoses Clinical Impression: 1. Closed nondisplaced fracture of surgical neck of left humerus, unspecified fracture morphology, initial encounter 2. Fall, initial encounter 3. Acute alcoholic intoxication without complication ([facility], [place] [address] Disposition Disposition:  Home. Nidia Theodore M.D. FAIZA Board Certified Emergency Physician Provider Attestation: If a scribe was utilized in generation of this patient record, I personally performed the services described in the documentation, reviewed the documentation, as recorded by the scribe in my presence, and it accurately records the patient's history of presenting illness, review of systems, patient physical examination, and procedures performed by me as the attending physician. Brian Snider M.D. AB Board Certified Emergency Physician 
10/8/2018. Scribe Attestation Ifeanyi Huston acting as a scribe for and in the presence of Clive Phipps MD     
October 08, 2018 at 4:05 PM 
    
Provider Attestation:     
I personally performed the services described in the documentation, reviewed the documentation, as recorded by the scribe in my presence, and it accurately and completely records my words and actions.  October 08, 2018 at 4:05 PM - Clive Phipps MD

## 2018-10-08 NOTE — ED NOTES
Patient resting on cart. No acute distress. VSS. Respirations are easy and unlabored. DONALD x4 with purpose and good strength.

## 2018-10-09 VITALS
SYSTOLIC BLOOD PRESSURE: 100 MMHG | DIASTOLIC BLOOD PRESSURE: 70 MMHG | RESPIRATION RATE: 23 BRPM | OXYGEN SATURATION: 100 % | TEMPERATURE: 97.8 F | HEART RATE: 95 BPM

## 2018-10-09 LAB
ATRIAL RATE: 97 BPM
CALCULATED P AXIS, ECG09: 79 DEGREES
CALCULATED R AXIS, ECG10: -42 DEGREES
CALCULATED T AXIS, ECG11: -142 DEGREES
DIAGNOSIS, 93000: NORMAL
P-R INTERVAL, ECG05: 124 MS
Q-T INTERVAL, ECG07: 438 MS
QRS DURATION, ECG06: 98 MS
QTC CALCULATION (BEZET), ECG08: 556 MS
VENTRICULAR RATE, ECG03: 97 BPM

## 2018-10-09 NOTE — ED NOTES
I have reviewed discharge instructions with the patient. The patient verbalized understanding. Patient armband removed and shredded. Pt left the ED with medical transporter as pt O2 dependent, unable to tolerate mobilities due to pain.

## 2018-10-09 NOTE — ED TRIAGE NOTES
Pt arrived by EMS. Pt was seen at the ED for fall and shoulder dislocation on left side and got discharged. Pt came back to ED due to severe pain and SOB due to pain. Pt stated pt did not fill discharge pain medication yet.

## 2018-10-09 NOTE — ED PROVIDER NOTES
EMERGENCY DEPARTMENT HISTORY AND PHYSICAL EXAM 
 
8:00 PM 
 
 
Date: 10/8/2018 Patient Name: Angie Wilson History of Presenting Illness Chief Complaint Patient presents with  Shortness of Breath  Shoulder Pain History Provided By: Patient Chief Complaint: SOB Duration:  a few hours Timing:  Constant Location: N/A Quality: N/A Severity: Moderate Modifying Factors: None. Associated Symptoms: nausea and 1 episode of vomiting Additional History (Context): Angie Wilson is a 60 y.o. male with hx of COPD who presents to the ED via EMS with c/o moderate, constant shortness of breath onset a few hours ago. Pt notes he was in ED earlier today for an injury to his left shoulder and was discharged home. He states that when his pain is worsened he finds it more difficult to breathe. Pt has a hx of copd and is on 2-3L O2 NC at home. Associated sx include nausea and one episode of vomiting. Denies any fever, chills, CP, abdominal pain, diarrhea and any urinary sx. No other sx or complaints at this time. PCP: None Current Facility-Administered Medications Medication Dose Route Frequency Provider Last Rate Last Dose  
 HYDROcodone-acetaminophen (NORCO) 5-325 mg per tablet 1 Tab  1 Tab Oral NOW Gal Raman MD      
 
Current Outpatient Prescriptions Medication Sig Dispense Refill  ibuprofen (MOTRIN) 800 mg tablet Take 1 Tab by mouth every eight (8) hours for 5 days. 15 Tab 0  
 HYDROcodone-acetaminophen (NORCO) 5-325 mg per tablet Take 1-2 tablets PO every 4-6 hours as needed for pain control. If over the counter ibuprofen or acetaminophen was suggested, then only take the vicodin for pain not well controlled with the over the counter medication. 6 Tab 0  
 CIPROFLOXACIN HCL (CIPRO PO) Take  by mouth.  oxyCODONE-acetaminophen (PERCOCET) 5-325 mg per tablet Take 1 Tab by mouth every four (4) hours as needed. Past History Past Medical History: Past Medical History:  
Diagnosis Date  Hematuria Past Surgical History: 
Past Surgical History:  
Procedure Laterality Date  HX WISDOM TEETH EXTRACTION Family History: 
Family History Problem Relation Age of Onset  Cancer Father Stomach  Stroke Mother Social History: 
Social History Substance Use Topics  Smoking status: Current Every Day Smoker Packs/day: 1.00 Years: 40.00 Types: Cigarettes  Smokeless tobacco: Never Used  Alcohol use Yes Comment: 1-2 beers at [facility] Allergies: Allergies Allergen Reactions  Sulfa (Sulfonamide Antibiotics) Unknown (comments) Review of Systems Review of Systems Constitutional: Negative for fever. HENT: Negative for trouble swallowing. Respiratory: Positive for cough and shortness of breath. Cardiovascular: Negative for chest pain. Gastrointestinal: Positive for vomiting. Negative for abdominal pain and diarrhea. Genitourinary: Negative for difficulty urinating. Musculoskeletal: Positive for arthralgias (left shoulder pain). Negative for back pain and neck pain. Skin: Positive for wound. Neurological: Negative for syncope. Psychiatric/Behavioral: Negative for behavioral problems. All other systems reviewed and are negative. Physical Exam  
 
Visit Vitals  BP (!) 137/95  Pulse 100  Temp 97.8 °F (36.6 °C)  Resp 21  SpO2 100% Physical Exam  
Constitutional: He is oriented to person, place, and time. He appears well-developed. No distress. Chronically ill-appearing, nad HENT:  
Head: Normocephalic and atraumatic. Eyes: EOM are normal.  
Neck: Normal range of motion. Cardiovascular: Normal rate and intact distal pulses. Pulmonary/Chest: Effort normal. No respiratory distress. Some distant breath sounds Abdominal: Soft. There is no tenderness. Musculoskeletal: He exhibits no edema. Known left proximal humerus fracture, sling in place. Otherwise mechanically stable Neurological: He is alert and oriented to person, place, and time. No focal deficits noted Skin: Skin is warm. Psychiatric: His behavior is normal.  
Nursing note and vitals reviewed. Diagnostic Study Results Labs - Recent Results (from the past 12 hour(s)) EKG, 12 LEAD, INITIAL Collection Time: 10/08/18 11:01 AM  
Result Value Ref Range Ventricular Rate 96 BPM  
 Atrial Rate 96 BPM  
 P-R Interval 128 ms QRS Duration 96 ms  
 Q-T Interval 438 ms QTC Calculation (Bezet) 553 ms Calculated P Axis 83 degrees Calculated R Axis -45 degrees Calculated T Axis -102 degrees Diagnosis Sinus rhythm with frequent premature ventricular complexes Possible Left atrial enlargement Left axis deviation Incomplete right bundle branch block Marked T wave abnormality, consider anterolateral ischemia Prolonged QT Abnormal ECG No previous ECGs available CBC WITH AUTOMATED DIFF Collection Time: 10/08/18 12:05 PM  
Result Value Ref Range WBC 6.4 4.6 - 13.2 K/uL  
 RBC 4.73 4.70 - 5.50 M/uL  
 HGB 16.1 (H) 13.0 - 16.0 g/dL HCT 44.5 36.0 - 48.0 % MCV 94.1 74.0 - 97.0 FL  
 MCH 34.0 24.0 - 34.0 PG  
 MCHC 36.2 31.0 - 37.0 g/dL  
 RDW 11.7 11.6 - 14.5 % PLATELET 596 697 - 977 K/uL MPV 9.4 9.2 - 11.8 FL  
 NEUTROPHILS 80 (H) 40 - 73 % LYMPHOCYTES 15 (L) 21 - 52 % MONOCYTES 5 3 - 10 % EOSINOPHILS 0 0 - 5 % BASOPHILS 0 0 - 2 %  
 ABS. NEUTROPHILS 5.1 1.8 - 8.0 K/UL  
 ABS. LYMPHOCYTES 1.0 0.9 - 3.6 K/UL  
 ABS. MONOCYTES 0.3 0.05 - 1.2 K/UL  
 ABS. EOSINOPHILS 0.0 0.0 - 0.4 K/UL  
 ABS. BASOPHILS 0.0 0.0 - 0.1 K/UL  
 DF AUTOMATED METABOLIC PANEL, COMPREHENSIVE Collection Time: 10/08/18 12:05 PM  
Result Value Ref Range Sodium 134 (L) 136 - 145 mmol/L Potassium 3.6 3.5 - 5.5 mmol/L  Chloride 95 (L) 100 - 108 mmol/L  
 CO2 25 21 - 32 mmol/L  
 Anion gap 14 3.0 - 18 mmol/L Glucose 96 74 - 99 mg/dL BUN 3 (L) 7.0 - 18 MG/DL Creatinine 0.53 (L) 0.6 - 1.3 MG/DL  
 BUN/Creatinine ratio 6 (L) 12 - 20 GFR est AA >60 >60 ml/min/1.73m2 GFR est non-AA >60 >60 ml/min/1.73m2 Calcium 8.1 (L) 8.5 - 10.1 MG/DL Bilirubin, total 0.7 0.2 - 1.0 MG/DL  
 ALT (SGPT) 30 16 - 61 U/L  
 AST (SGOT) 69 (H) 15 - 37 U/L Alk. phosphatase 164 (H) 45 - 117 U/L Protein, total 6.9 6.4 - 8.2 g/dL Albumin 3.3 (L) 3.4 - 5.0 g/dL Globulin 3.6 2.0 - 4.0 g/dL A-G Ratio 0.9 0.8 - 1.7 MAGNESIUM Collection Time: 10/08/18 12:05 PM  
Result Value Ref Range Magnesium 1.8 1.6 - 2.6 mg/dL CARDIAC PANEL,(CK, CKMB & TROPONIN) Collection Time: 10/08/18 12:05 PM  
Result Value Ref Range  39 - 308 U/L  
 CK - MB 4.4 (H) <3.6 ng/ml CK-MB Index 4.0 0.0 - 4.0 % Troponin-I, Qt. 0.04 0.0 - 0.045 NG/ML  
ETHYL ALCOHOL Collection Time: 10/08/18 12:05 PM  
Result Value Ref Range ALCOHOL(ETHYL),SERUM 253 (H) 0 - 3 MG/DL  
DRUG SCREEN, URINE Collection Time: 10/08/18 12:33 PM  
Result Value Ref Range BENZODIAZEPINES NEGATIVE  NEG    
 BARBITURATES NEGATIVE  NEG    
 THC (TH-CANNABINOL) NEGATIVE  NEG    
 OPIATES NEGATIVE  NEG    
 PCP(PHENCYCLIDINE) NEGATIVE  NEG    
 COCAINE NEGATIVE  NEG    
 AMPHETAMINES NEGATIVE  NEG METHADONE NEGATIVE  NEG HDSCOM (NOTE) Radiologic Studies - No orders to display Medical Decision Making I am the first provider for this patient. I reviewed the vital signs, available nursing notes, past medical history, past surgical history, family history and social history. Vital Signs-Reviewed the patient's vital signs. Pulse Oximetry Analysis -  2L of O2 via NC (Abnormal) Cardiac Monitor: 
Rate: 98 Rhythm:  Normal Sinus Rhythm Records Reviewed: Nursing Notes (Time of Review: 8:00 PM) ED Course: Progress Notes, Reevaluation, and Consults: Provider Notes (Medical Decision Making): MDM Number of Diagnoses or Management Options Other closed displaced fracture of proximal end of left humerus with routine healing, subsequent encounter:  
SOB (shortness of breath):  
Diagnosis management comments: 59 yo CM with PMHx copd presents by EMS with left shoulder pain and shortness of breath. Pt in ED earlier today for similar, has left humerus fracture but had otherwise unremarkable work-up with CT chest, ekg, trop, etc.  Examination with somewhat distant breath sounds and known left humerus fracture with sling in place. Symptoms seem related to pain control and pt has not filled discharge meds. Will not repeat work-up at this time, treat symptomatically, dc home, symptom management, follow-up, return precautions. 10:36 PM 
Pt doing ok, looks more comfortable, tolerating po. Discussed poc for dc home, symptom management, follow-up, return precautions. Amount and/or Complexity of Data Reviewed Clinical lab tests: reviewed Tests in the radiology section of CPT®: reviewed Obtain history from someone other than the patient: yes Review and summarize past medical records: yes Independent visualization of images, tracings, or specimens: yes Patient Progress Patient progress: stable Procedures: EKG Date/Time: 10/8/2018 9:00 PM 
Performed by: Annabel Barros Authorized by: Annabel Barros  
 
ECG reviewed by ED Physician in the absence of a cardiologist: no   
Previous ECG:  
  Previous ECG:  Unavailable Interpretation: Interpretation: non-specific Rate:  
  ECG rate:  103 ECG rate assessment: tachycardic Rhythm:  
  Rhythm: sinus tachycardia Ectopy:  
  Ectopy: none QRS:  
  QRS axis:  Normal 
Conduction:  
  Conduction: normal   
ST segments: ST segments:  Normal 
T waves:  
  T waves: inverted Q waves:  
  Q waves:  V1, V2, V3 and aVR Diagnosis Clinical Impression: 1. Other closed displaced fracture of proximal end of left humerus with routine healing, subsequent encounter 2. SOB (shortness of breath) Disposition: Discharged Follow-up Information Follow up With Details Comments Contact Info [facility] EMERGENCY DEPT Go to As needed, If symptoms worsen [address] 
[phone] China Torrez MD Call in 2 days  [address] [place] [address] 
[phone] Patient's Medications Start Taking No medications on file Continue Taking CIPROFLOXACIN HCL (CIPRO PO)    Take  by mouth. HYDROCODONE-ACETAMINOPHEN (NORCO) 5-325 MG PER TABLET    Take 1-2 tablets PO every 4-6 hours as needed for pain control. If over the counter ibuprofen or acetaminophen was suggested, then only take the vicodin for pain not well controlled with the over the counter medication. IBUPROFEN (MOTRIN) 800 MG TABLET    Take 1 Tab by mouth every eight (8) hours for 5 days. OXYCODONE-ACETAMINOPHEN (PERCOCET) 5-325 MG PER TABLET    Take 1 Tab by mouth every four (4) hours as needed. These Medications have changed No medications on file Stop Taking No medications on file  
 
_______________________________ Attestations: 
Scribe Attestation Nilsa Jackson acting as a scribe for and in the presence of Giovana Pablo MD     
October 08, 2018 at 8:00 PM 
    
Provider Attestation:     
I personally performed the services described in the documentation, reviewed the documentation, as recorded by the scribe in my presence, and it accurately and completely records my words and actions. October 08, 2018 at 8:00 PM - Giovana Pablo MD   
_______________________________

## 2018-10-09 NOTE — DISCHARGE INSTRUCTIONS
Broken Arm: Care Instructions  Your Care Instructions  Fractures can range from a small, hairline crack, to a bone or bones broken into two or more pieces. Your treatment depends on how bad the break is. Your doctor may have put your arm in a splint or cast to allow it to heal or to keep it stable until you see another doctor. It may take weeks or months for your arm to heal. You can help your arm heal with some care at home. You heal best when you take good care of yourself. Eat a variety of healthy foods, and don't smoke. You may have had a sedative to help you relax. You may be unsteady after having sedation. It can take a few hours for the medicine's effects to wear off. Common side effects of sedation include nausea, vomiting, and feeling sleepy or tired. The doctor has checked you carefully, but problems can develop later. If you notice any problems or new symptoms, get medical treatment right away. Follow-up care is a key part of your treatment and safety. Be sure to make and go to all appointments, and call your doctor if you are having problems. It's also a good idea to know your test results and keep a list of the medicines you take. How can you care for yourself at home? · If the doctor gave you a sedative:  ¨ For 24 hours, don't do anything that requires attention to detail. It takes time for the medicine's effects to completely wear off. ¨ For your safety, do not drive or operate any machinery that could be dangerous. Wait until the medicine wears off and you can think clearly and react easily. · Put ice or a cold pack on your arm for 10 to 20 minutes at a time. Try to do this every 1 to 2 hours for the next 3 days (when you are awake). Put a thin cloth between the ice and your cast or splint. Keep the cast or splint dry. · Follow the cast care instructions your doctor gives you. If you have a splint, do not take it off unless your doctor tells you to. · Be safe with medicines.  Take pain medicines exactly as directed. ¨ If the doctor gave you a prescription medicine for pain, take it as prescribed. ¨ If you are not taking a prescription pain medicine, ask your doctor if you can take an over-the-counter medicine. · Prop up your arm on pillows when you sit or lie down in the first few days after the injury. Keep the arm higher than the level of your heart. This will help reduce swelling. · Follow instructions for exercises to keep your arm strong. · Wiggle your fingers and wrist often to reduce swelling and stiffness. When should you call for help? Call 911 anytime you think you may need emergency care. For example, call if:    · You are very sleepy and you have trouble waking up.    Call your doctor now or seek immediate medical care if:    · You have new or worse nausea or vomiting.     · You have new or worse pain.     · Your hand or fingers are cool or pale or change color.     · Your cast or splint feels too tight.     · You have tingling, weakness, or numbness in your hand or fingers.    Watch closely for changes in your health, and be sure to contact your doctor if:    · You do not get better as expected.     · You have problems with your cast or splint. Where can you learn more? Go to http://ojhn-antoine.info/. Enter E397 in the search box to learn more about \"Broken Arm: Care Instructions. \"  Current as of: November 29, 2017  Content Version: 11.8  © 8840-4642 [employer]. Care instructions adapted under license by [employer] (which disclaims liability or warranty for this information). If you have questions about a medical condition or this instruction, always ask your healthcare professional. [address] any warranty or liability for your use of this information.